# Patient Record
Sex: FEMALE | Race: WHITE | NOT HISPANIC OR LATINO
[De-identification: names, ages, dates, MRNs, and addresses within clinical notes are randomized per-mention and may not be internally consistent; named-entity substitution may affect disease eponyms.]

---

## 2021-02-22 PROBLEM — Z00.00 ENCOUNTER FOR PREVENTIVE HEALTH EXAMINATION: Status: ACTIVE | Noted: 2021-02-22

## 2021-03-15 ENCOUNTER — APPOINTMENT (OUTPATIENT)
Dept: ANTEPARTUM | Facility: CLINIC | Age: 21
End: 2021-03-15
Payer: COMMERCIAL

## 2021-03-15 ENCOUNTER — ASOB RESULT (OUTPATIENT)
Age: 21
End: 2021-03-15

## 2021-03-15 PROCEDURE — 76811 OB US DETAILED SNGL FETUS: CPT

## 2021-03-15 PROCEDURE — 99072 ADDL SUPL MATRL&STAF TM PHE: CPT

## 2021-03-23 ENCOUNTER — APPOINTMENT (OUTPATIENT)
Dept: ANTEPARTUM | Facility: CLINIC | Age: 21
End: 2021-03-23

## 2021-07-30 ENCOUNTER — INPATIENT (INPATIENT)
Facility: HOSPITAL | Age: 21
LOS: 1 days | Discharge: HOME | End: 2021-08-01
Attending: OBSTETRICS & GYNECOLOGY | Admitting: OBSTETRICS & GYNECOLOGY
Payer: COMMERCIAL

## 2021-07-30 VITALS — DIASTOLIC BLOOD PRESSURE: 63 MMHG | OXYGEN SATURATION: 98 % | HEART RATE: 100 BPM | SYSTOLIC BLOOD PRESSURE: 109 MMHG

## 2021-07-30 DIAGNOSIS — Z34.80 ENCOUNTER FOR SUPERVISION OF OTHER NORMAL PREGNANCY, UNSPECIFIED TRIMESTER: ICD-10-CM

## 2021-07-30 DIAGNOSIS — O26.899 OTHER SPECIFIED PREGNANCY RELATED CONDITIONS, UNSPECIFIED TRIMESTER: ICD-10-CM

## 2021-07-30 DIAGNOSIS — Z3A.00 WEEKS OF GESTATION OF PREGNANCY NOT SPECIFIED: ICD-10-CM

## 2021-07-30 LAB
BASOPHILS # BLD AUTO: 0.03 K/UL — SIGNIFICANT CHANGE UP (ref 0–0.2)
BASOPHILS NFR BLD AUTO: 0.2 % — SIGNIFICANT CHANGE UP (ref 0–2)
BLD GP AB SCN SERPL QL: NEGATIVE — SIGNIFICANT CHANGE UP
COVID-19 SPIKE DOMAIN AB INTERP: NEGATIVE — SIGNIFICANT CHANGE UP
COVID-19 SPIKE DOMAIN ANTIBODY RESULT: 0.4 U/ML — SIGNIFICANT CHANGE UP
EOSINOPHIL # BLD AUTO: 0.02 K/UL — SIGNIFICANT CHANGE UP (ref 0–0.5)
EOSINOPHIL NFR BLD AUTO: 0.1 % — SIGNIFICANT CHANGE UP (ref 0–6)
HCT VFR BLD CALC: 38.6 % — SIGNIFICANT CHANGE UP (ref 34.5–45)
HGB BLD-MCNC: 13 G/DL — SIGNIFICANT CHANGE UP (ref 11.5–15.5)
IMM GRANULOCYTES NFR BLD AUTO: 1.2 % — SIGNIFICANT CHANGE UP (ref 0–1.5)
LYMPHOCYTES # BLD AUTO: 1.64 K/UL — SIGNIFICANT CHANGE UP (ref 1–3.3)
LYMPHOCYTES # BLD AUTO: 9.6 % — LOW (ref 13–44)
MCHC RBC-ENTMCNC: 30.2 PG — SIGNIFICANT CHANGE UP (ref 27–34)
MCHC RBC-ENTMCNC: 33.7 GM/DL — SIGNIFICANT CHANGE UP (ref 32–36)
MCV RBC AUTO: 89.8 FL — SIGNIFICANT CHANGE UP (ref 80–100)
MONOCYTES # BLD AUTO: 0.83 K/UL — SIGNIFICANT CHANGE UP (ref 0–0.9)
MONOCYTES NFR BLD AUTO: 4.8 % — SIGNIFICANT CHANGE UP (ref 2–14)
NEUTROPHILS # BLD AUTO: 14.39 K/UL — HIGH (ref 1.8–7.4)
NEUTROPHILS NFR BLD AUTO: 84.1 % — HIGH (ref 43–77)
NRBC # BLD: 0 /100 WBCS — SIGNIFICANT CHANGE UP (ref 0–0)
PLATELET # BLD AUTO: 156 K/UL — SIGNIFICANT CHANGE UP (ref 150–400)
RBC # BLD: 4.3 M/UL — SIGNIFICANT CHANGE UP (ref 3.8–5.2)
RBC # FLD: 12.3 % — SIGNIFICANT CHANGE UP (ref 10.3–14.5)
RH IG SCN BLD-IMP: POSITIVE — SIGNIFICANT CHANGE UP
SARS-COV-2 IGG+IGM SERPL QL IA: 0.4 U/ML — SIGNIFICANT CHANGE UP
SARS-COV-2 IGG+IGM SERPL QL IA: NEGATIVE — SIGNIFICANT CHANGE UP
SARS-COV-2 RNA SPEC QL NAA+PROBE: SIGNIFICANT CHANGE UP
T PALLIDUM AB TITR SER: NEGATIVE — SIGNIFICANT CHANGE UP
WBC # BLD: 17.12 K/UL — HIGH (ref 3.8–10.5)
WBC # FLD AUTO: 17.12 K/UL — HIGH (ref 3.8–10.5)

## 2021-07-30 RX ORDER — SODIUM CHLORIDE 9 MG/ML
1000 INJECTION, SOLUTION INTRAVENOUS
Refills: 0 | Status: DISCONTINUED | OUTPATIENT
Start: 2021-07-30 | End: 2021-07-30

## 2021-07-30 RX ORDER — BENZOCAINE 10 %
1 GEL (GRAM) MUCOUS MEMBRANE EVERY 6 HOURS
Refills: 0 | Status: DISCONTINUED | OUTPATIENT
Start: 2021-07-30 | End: 2021-08-01

## 2021-07-30 RX ORDER — DIPHENHYDRAMINE HCL 50 MG
25 CAPSULE ORAL EVERY 6 HOURS
Refills: 0 | Status: DISCONTINUED | OUTPATIENT
Start: 2021-07-30 | End: 2021-08-01

## 2021-07-30 RX ORDER — OXYTOCIN 10 UNIT/ML
333.33 VIAL (ML) INJECTION
Qty: 20 | Refills: 0 | Status: DISCONTINUED | OUTPATIENT
Start: 2021-07-30 | End: 2021-07-30

## 2021-07-30 RX ORDER — LANOLIN
1 OINTMENT (GRAM) TOPICAL EVERY 6 HOURS
Refills: 0 | Status: DISCONTINUED | OUTPATIENT
Start: 2021-07-30 | End: 2021-08-01

## 2021-07-30 RX ORDER — OXYCODONE HYDROCHLORIDE 5 MG/1
5 TABLET ORAL
Refills: 0 | Status: DISCONTINUED | OUTPATIENT
Start: 2021-07-30 | End: 2021-08-01

## 2021-07-30 RX ORDER — DIBUCAINE 1 %
1 OINTMENT (GRAM) RECTAL EVERY 6 HOURS
Refills: 0 | Status: DISCONTINUED | OUTPATIENT
Start: 2021-07-30 | End: 2021-08-01

## 2021-07-30 RX ORDER — IBUPROFEN 200 MG
600 TABLET ORAL EVERY 6 HOURS
Refills: 0 | Status: COMPLETED | OUTPATIENT
Start: 2021-07-30 | End: 2022-06-28

## 2021-07-30 RX ORDER — KETOROLAC TROMETHAMINE 30 MG/ML
30 SYRINGE (ML) INJECTION ONCE
Refills: 0 | Status: DISCONTINUED | OUTPATIENT
Start: 2021-07-30 | End: 2021-07-30

## 2021-07-30 RX ORDER — IBUPROFEN 200 MG
600 TABLET ORAL EVERY 6 HOURS
Refills: 0 | Status: DISCONTINUED | OUTPATIENT
Start: 2021-07-30 | End: 2021-08-01

## 2021-07-30 RX ORDER — TETANUS TOXOID, REDUCED DIPHTHERIA TOXOID AND ACELLULAR PERTUSSIS VACCINE, ADSORBED 5; 2.5; 8; 8; 2.5 [IU]/.5ML; [IU]/.5ML; UG/.5ML; UG/.5ML; UG/.5ML
0.5 SUSPENSION INTRAMUSCULAR ONCE
Refills: 0 | Status: DISCONTINUED | OUTPATIENT
Start: 2021-07-30 | End: 2021-08-01

## 2021-07-30 RX ORDER — SODIUM CHLORIDE 9 MG/ML
1000 INJECTION, SOLUTION INTRAVENOUS
Refills: 0 | Status: DISCONTINUED | OUTPATIENT
Start: 2021-07-30 | End: 2021-08-01

## 2021-07-30 RX ORDER — SIMETHICONE 80 MG/1
80 TABLET, CHEWABLE ORAL EVERY 4 HOURS
Refills: 0 | Status: DISCONTINUED | OUTPATIENT
Start: 2021-07-30 | End: 2021-08-01

## 2021-07-30 RX ORDER — AER TRAVELER 0.5 G/1
1 SOLUTION RECTAL; TOPICAL EVERY 4 HOURS
Refills: 0 | Status: DISCONTINUED | OUTPATIENT
Start: 2021-07-30 | End: 2021-08-01

## 2021-07-30 RX ORDER — MAGNESIUM HYDROXIDE 400 MG/1
30 TABLET, CHEWABLE ORAL
Refills: 0 | Status: DISCONTINUED | OUTPATIENT
Start: 2021-07-30 | End: 2021-08-01

## 2021-07-30 RX ORDER — OXYTOCIN 10 UNIT/ML
333.33 VIAL (ML) INJECTION
Qty: 20 | Refills: 0 | Status: DISCONTINUED | OUTPATIENT
Start: 2021-07-30 | End: 2021-08-01

## 2021-07-30 RX ORDER — CITRIC ACID/SODIUM CITRATE 300-500 MG
15 SOLUTION, ORAL ORAL EVERY 6 HOURS
Refills: 0 | Status: DISCONTINUED | OUTPATIENT
Start: 2021-07-30 | End: 2021-07-30

## 2021-07-30 RX ORDER — SODIUM CHLORIDE 9 MG/ML
3 INJECTION INTRAMUSCULAR; INTRAVENOUS; SUBCUTANEOUS EVERY 8 HOURS
Refills: 0 | Status: DISCONTINUED | OUTPATIENT
Start: 2021-07-30 | End: 2021-08-01

## 2021-07-30 RX ORDER — HYDROCORTISONE 1 %
1 OINTMENT (GRAM) TOPICAL EVERY 6 HOURS
Refills: 0 | Status: DISCONTINUED | OUTPATIENT
Start: 2021-07-30 | End: 2021-08-01

## 2021-07-30 RX ORDER — ACETAMINOPHEN 500 MG
975 TABLET ORAL
Refills: 0 | Status: DISCONTINUED | OUTPATIENT
Start: 2021-07-30 | End: 2021-08-01

## 2021-07-30 RX ORDER — OXYCODONE HYDROCHLORIDE 5 MG/1
5 TABLET ORAL ONCE
Refills: 0 | Status: DISCONTINUED | OUTPATIENT
Start: 2021-07-30 | End: 2021-08-01

## 2021-07-30 RX ORDER — PRAMOXINE HYDROCHLORIDE 150 MG/15G
1 AEROSOL, FOAM RECTAL EVERY 4 HOURS
Refills: 0 | Status: DISCONTINUED | OUTPATIENT
Start: 2021-07-30 | End: 2021-08-01

## 2021-07-30 RX ADMIN — Medication 30 MILLIGRAM(S): at 08:43

## 2021-07-30 RX ADMIN — Medication 975 MILLIGRAM(S): at 17:05

## 2021-07-30 RX ADMIN — Medication 975 MILLIGRAM(S): at 23:15

## 2021-07-30 RX ADMIN — Medication 600 MILLIGRAM(S): at 15:33

## 2021-07-30 RX ADMIN — Medication 600 MILLIGRAM(S): at 15:03

## 2021-07-30 RX ADMIN — Medication 600 MILLIGRAM(S): at 20:22

## 2021-07-30 RX ADMIN — Medication 1000 MILLIUNIT(S)/MIN: at 08:51

## 2021-07-30 RX ADMIN — Medication 1 TABLET(S): at 15:03

## 2021-07-30 RX ADMIN — Medication 975 MILLIGRAM(S): at 22:40

## 2021-07-30 RX ADMIN — Medication 600 MILLIGRAM(S): at 20:55

## 2021-07-30 RX ADMIN — Medication 975 MILLIGRAM(S): at 16:35

## 2021-07-30 NOTE — OB PROVIDER H&P - HISTORY OF PRESENT ILLNESS
Admission H&P    Subjective  HPI: 20yo  @40+4 presents for chief concern of leakage of yellow-green fluid at 1:30am, with continuous leaking since then. Patient also endorses Ctx q2-3min. +FM. -VB. Pt denies any other concerns.    – PNC: Denies prenatal issues. GBS neg.  EFW 3400g by alverto.  – OBHx: denies  – GynHx: denies  – PMH: denies  – PSH: denies  – Psych: denies   – Social: denies   – Meds: PNV   – Allergies: NKDA  – Will accept blood transfusions? Yes

## 2021-07-30 NOTE — OB PROVIDER H&P - PROBLEM SELECTOR PLAN 1
Plan  1. Admit to LND. Routine Labs. IVF.  2. Expectant management.  3. Fetus: cat 1 tracing. VTX. EFW 3400g by sono. Continuous EFM. Sono. No concerns.  4. Prenatal issues: none  5. GBS neg  6. Pain: called for epidural    Patient discussed with attending physician, Dr. Verma.  Tina Aranda MD PGY2

## 2021-07-30 NOTE — OB RN DELIVERY SUMMARY - NS_SEPSISRSKCALC_OBGYN_ALL_OB_FT
EOS calculated successfully. EOS Risk Factor: 0.5/1000 live births ([facility] national incidence); GA=40w4d; Temp=98.6; ROM=5.9; GBS='Negative'; Antibiotics='No antibiotics or any antibiotics < 2 hrs prior to birth'

## 2021-07-30 NOTE — OB PROVIDER DELIVERY SUMMARY - NSPROVIDERDELIVERYNOTE_OBGYN_ALL_OB_FT
VAVD, pt delivered of a viable female infant apgar 9/9 . placenta complete and uterus explored. MLE, ebl 250 cc. vacuum done from OA+3 over two contractions secondary to NRFHT

## 2021-07-30 NOTE — OB NEONATOLOGY/PEDIATRICIAN DELIVERY SUMMARY - NSPEDSNEONOTESA_OBGYN_ALL_OB_FT
BABY ARY is a 40.4weeek GA FEMALE born to a 20yo  mother via vacuum-assisted vaginal delivery. No pop-offs. No significant maternal history. No significant prenatal history. BT O+. PNL -/-/immune/NR. GBS negative on . SROM 01:40 on  with light meconium. WDSS. APGARS 9/9. EOS 0.12. COVID NEGATIVE. Wants to breast feed. Declines hepatitis B vaccine.

## 2021-07-30 NOTE — OB PROVIDER H&P - NSHPPHYSICALEXAM_GEN_ALL_CORE
Objective  – Vital Signs  Vital Signs Last 24 Hrs  T(C): 36.7 (30 Jul 2021 05:17), Max: 36.7 (30 Jul 2021 05:11)  T(F): 98.1 (30 Jul 2021 05:11), Max: 98.1 (30 Jul 2021 05:11)  HR: 111 (30 Jul 2021 05:18) (81 - 111)  BP: 109/63 (30 Jul 2021 05:17) (109/63 - 109/63)  RR: 18 (30 Jul 2021 05:17) (18 - 18)  SpO2: 99% (30 Jul 2021 05:18) (94% - 99%)    – PE:   CV: RRR  Pulm: breathing comfortably on RA  Abd: gravid, nontender  Extr: moving all extremities with ease  – VE: 3/85/-1, grossly ruptured, light meconium  – FHT: baseline 140, mod variability, +accels, -decels  – [place]: q2-3min  – EFW: 3400g by sono  – Sono: vertex

## 2021-07-31 DIAGNOSIS — Z37.9 OUTCOME OF DELIVERY, UNSPECIFIED: ICD-10-CM

## 2021-07-31 RX ADMIN — Medication 600 MILLIGRAM(S): at 21:00

## 2021-07-31 RX ADMIN — Medication 600 MILLIGRAM(S): at 21:45

## 2021-07-31 RX ADMIN — Medication 1 TABLET(S): at 12:40

## 2021-07-31 RX ADMIN — Medication 975 MILLIGRAM(S): at 18:11

## 2021-07-31 RX ADMIN — Medication 975 MILLIGRAM(S): at 13:30

## 2021-07-31 RX ADMIN — Medication 975 MILLIGRAM(S): at 05:53

## 2021-07-31 RX ADMIN — Medication 600 MILLIGRAM(S): at 09:40

## 2021-07-31 RX ADMIN — Medication 600 MILLIGRAM(S): at 15:17

## 2021-07-31 RX ADMIN — Medication 600 MILLIGRAM(S): at 08:49

## 2021-07-31 RX ADMIN — Medication 600 MILLIGRAM(S): at 03:05

## 2021-07-31 RX ADMIN — Medication 975 MILLIGRAM(S): at 12:39

## 2021-07-31 RX ADMIN — Medication 975 MILLIGRAM(S): at 19:00

## 2021-07-31 RX ADMIN — Medication 600 MILLIGRAM(S): at 03:40

## 2021-07-31 RX ADMIN — Medication 600 MILLIGRAM(S): at 16:00

## 2021-07-31 NOTE — PROGRESS NOTE ADULT - SUBJECTIVE AND OBJECTIVE BOX
Postpartum Note- PPD#1    Prenatal Labs  Blood type: O Positive  Rubella IgG: Immune  RPR: Negative        S:Patient c/o perineal discomfort.  Pt is OOB, tolerating PO, voiding. Lochia WNL.     O:  Vital Signs Last 24 Hrs  T(C): 36.7 (31 Jul 2021 06:25), Max: 36.9 (30 Jul 2021 08:02)  T(F): 98 (31 Jul 2021 06:25), Max: 98.4 (30 Jul 2021 08:02)  HR: 91 (31 Jul 2021 06:25) (91 - 142)  BP: 106/67 (31 Jul 2021 06:25) (102/52 - 125/74)  BP(mean): --  RR: 18 (31 Jul 2021 06:25) (15 - 18)  SpO2: 96% (31 Jul 2021 06:25) (93% - 99%)     Gen: NAD  Abdomen: Soft, nontender, non-distended, fundus firm.  Vaginal: Lochia WNL,    Ext:  Neg calf tenderness    LABS:    Hemoglobin: 13.0 g/dL (07-30 @ 05:04)      Hematocrit: 38.6 % (07-30 @ 05:04)

## 2021-08-01 ENCOUNTER — TRANSCRIPTION ENCOUNTER (OUTPATIENT)
Age: 21
End: 2021-08-01

## 2021-08-01 VITALS
OXYGEN SATURATION: 98 % | SYSTOLIC BLOOD PRESSURE: 102 MMHG | RESPIRATION RATE: 18 BRPM | TEMPERATURE: 98 F | DIASTOLIC BLOOD PRESSURE: 65 MMHG | HEART RATE: 93 BPM

## 2021-08-01 PROCEDURE — U0003: CPT

## 2021-08-01 PROCEDURE — 59050 FETAL MONITOR W/REPORT: CPT

## 2021-08-01 PROCEDURE — 86900 BLOOD TYPING SEROLOGIC ABO: CPT

## 2021-08-01 PROCEDURE — G0463: CPT

## 2021-08-01 PROCEDURE — 59025 FETAL NON-STRESS TEST: CPT

## 2021-08-01 PROCEDURE — U0005: CPT

## 2021-08-01 PROCEDURE — 86901 BLOOD TYPING SEROLOGIC RH(D): CPT

## 2021-08-01 PROCEDURE — 86850 RBC ANTIBODY SCREEN: CPT

## 2021-08-01 PROCEDURE — 85025 COMPLETE CBC W/AUTO DIFF WBC: CPT

## 2021-08-01 PROCEDURE — 86769 SARS-COV-2 COVID-19 ANTIBODY: CPT

## 2021-08-01 PROCEDURE — 86780 TREPONEMA PALLIDUM: CPT

## 2021-08-01 RX ORDER — OXYCODONE HYDROCHLORIDE 5 MG/1
1 TABLET ORAL
Qty: 12 | Refills: 0
Start: 2021-08-01 | End: 2021-08-03

## 2021-08-01 RX ADMIN — Medication 600 MILLIGRAM(S): at 09:00

## 2021-08-01 RX ADMIN — Medication 600 MILLIGRAM(S): at 03:45

## 2021-08-01 RX ADMIN — Medication 975 MILLIGRAM(S): at 12:02

## 2021-08-01 RX ADMIN — OXYCODONE HYDROCHLORIDE 5 MILLIGRAM(S): 5 TABLET ORAL at 09:06

## 2021-08-01 RX ADMIN — Medication 975 MILLIGRAM(S): at 00:05

## 2021-08-01 RX ADMIN — Medication 600 MILLIGRAM(S): at 03:00

## 2021-08-01 RX ADMIN — Medication 600 MILLIGRAM(S): at 09:30

## 2021-08-01 RX ADMIN — Medication 975 MILLIGRAM(S): at 06:17

## 2021-08-01 RX ADMIN — Medication 975 MILLIGRAM(S): at 00:50

## 2021-08-01 RX ADMIN — Medication 975 MILLIGRAM(S): at 06:55

## 2021-08-01 RX ADMIN — Medication 975 MILLIGRAM(S): at 12:32

## 2021-08-01 RX ADMIN — OXYCODONE HYDROCHLORIDE 5 MILLIGRAM(S): 5 TABLET ORAL at 09:36

## 2021-08-01 NOTE — DISCHARGE NOTE OB - CARE PROVIDER_API CALL
France Verma  OBSTETRICS AND GYNECOLOGY  [address], [address]  [place] [address]  Phone: [phone]  Fax: [phone]  Follow Up Time:

## 2021-08-01 NOTE — DISCHARGE NOTE OB - WILL THE PATIENT ACCEPT THE PFIZER COVID-19 VACCINE IF ELIGIBLE AND IT IS AVAILABLE?
Health Maintenance Due   Topic Date Due   • Pneumococcal Vaccine 0-64 (1 of 1 - PPSV23) 08/16/1964   • Shingles Vaccine (1 of 2) 08/16/2008       Patient is due for topics as listed above but is not proceeding with Immunization(s) Pneumococcal and Shingles and Abdominal Aortic Aneurysm (AAA) screening at this time.          No

## 2021-08-01 NOTE — DISCHARGE NOTE OB - CARE PLAN
Principal Discharge DX:	Vacuum-assisted vaginal delivery  Goal:	home  Assessment and plan of treatment:	as per PMD

## 2021-08-01 NOTE — DISCHARGE NOTE OB - WEAR SUPPORTIVE BRA
Spoke to patient notified to stop the medrol and continue with the other medications. If her glucose remains high should speak with her endocrinologist.     Statement Selected

## 2021-08-01 NOTE — DISCHARGE NOTE OB - MATERIALS PROVIDED
[facility] [place] Screening Program/Breastfeeding Log/Breastfeeding Mother’s Support Group Information/Guide to Postpartum Care/[facility] Hearing Screen Program/Back To Sleep Handout/Shaken Baby Prevention Handout/Breastfeeding Guide and Packet/Birth Certificate Instructions/Discharge Medication Information for Patients and Families Pocket Guide

## 2022-02-24 NOTE — OB RN PATIENT PROFILE - PRO PRENATAL LABS ORI SOURCE RUBELLA
Dr. Patel's nurse called back. Dr. Patel is in surgery. She will call clinic when she is finished.    hard copy, drawn during this pregnancy

## 2023-02-16 PROBLEM — Z78.9 OTHER SPECIFIED HEALTH STATUS: Chronic | Status: ACTIVE | Noted: 2021-07-30

## 2023-03-14 ENCOUNTER — NON-APPOINTMENT (OUTPATIENT)
Age: 23
End: 2023-03-14

## 2023-03-14 ENCOUNTER — APPOINTMENT (OUTPATIENT)
Dept: ANTEPARTUM | Facility: CLINIC | Age: 23
End: 2023-03-14
Payer: COMMERCIAL

## 2023-03-14 ENCOUNTER — ASOB RESULT (OUTPATIENT)
Age: 23
End: 2023-03-14

## 2023-03-14 PROCEDURE — 76805 OB US >/= 14 WKS SNGL FETUS: CPT

## 2023-06-09 ENCOUNTER — ASOB RESULT (OUTPATIENT)
Age: 23
End: 2023-06-09

## 2023-06-09 ENCOUNTER — APPOINTMENT (OUTPATIENT)
Dept: ANTEPARTUM | Facility: CLINIC | Age: 23
End: 2023-06-09
Payer: COMMERCIAL

## 2023-06-09 PROCEDURE — 76816 OB US FOLLOW-UP PER FETUS: CPT

## 2023-06-09 PROCEDURE — 76818 FETAL BIOPHYS PROFILE W/NST: CPT | Mod: 59

## 2023-07-24 ENCOUNTER — ASOB RESULT (OUTPATIENT)
Age: 23
End: 2023-07-24

## 2023-07-24 ENCOUNTER — APPOINTMENT (OUTPATIENT)
Dept: ANTEPARTUM | Facility: CLINIC | Age: 23
End: 2023-07-24
Payer: COMMERCIAL

## 2023-07-24 ENCOUNTER — INPATIENT (INPATIENT)
Facility: HOSPITAL | Age: 23
LOS: 1 days | Discharge: HOME | End: 2023-07-26
Attending: OBSTETRICS & GYNECOLOGY | Admitting: OBSTETRICS & GYNECOLOGY
Payer: COMMERCIAL

## 2023-07-24 VITALS — HEART RATE: 112 BPM | OXYGEN SATURATION: 98 %

## 2023-07-24 DIAGNOSIS — O26.899 OTHER SPECIFIED PREGNANCY RELATED CONDITIONS, UNSPECIFIED TRIMESTER: ICD-10-CM

## 2023-07-24 DIAGNOSIS — Z34.80 ENCOUNTER FOR SUPERVISION OF OTHER NORMAL PREGNANCY, UNSPECIFIED TRIMESTER: ICD-10-CM

## 2023-07-24 LAB
BASOPHILS # BLD AUTO: 0.03 K/UL — SIGNIFICANT CHANGE UP (ref 0–0.2)
BASOPHILS NFR BLD AUTO: 0.3 % — SIGNIFICANT CHANGE UP (ref 0–2)
EOSINOPHIL # BLD AUTO: 0.07 K/UL — SIGNIFICANT CHANGE UP (ref 0–0.5)
EOSINOPHIL NFR BLD AUTO: 0.8 % — SIGNIFICANT CHANGE UP (ref 0–6)
HCT VFR BLD CALC: 40.3 % — SIGNIFICANT CHANGE UP (ref 34.5–45)
HGB BLD-MCNC: 13.6 G/DL — SIGNIFICANT CHANGE UP (ref 11.5–15.5)
IMM GRANULOCYTES NFR BLD AUTO: 1.7 % — HIGH (ref 0–0.9)
LYMPHOCYTES # BLD AUTO: 1.87 K/UL — SIGNIFICANT CHANGE UP (ref 1–3.3)
LYMPHOCYTES # BLD AUTO: 20.7 % — SIGNIFICANT CHANGE UP (ref 13–44)
MCHC RBC-ENTMCNC: 29.9 PG — SIGNIFICANT CHANGE UP (ref 27–34)
MCHC RBC-ENTMCNC: 33.7 GM/DL — SIGNIFICANT CHANGE UP (ref 32–36)
MCV RBC AUTO: 88.6 FL — SIGNIFICANT CHANGE UP (ref 80–100)
MONOCYTES # BLD AUTO: 0.58 K/UL — SIGNIFICANT CHANGE UP (ref 0–0.9)
MONOCYTES NFR BLD AUTO: 6.4 % — SIGNIFICANT CHANGE UP (ref 2–14)
NEUTROPHILS # BLD AUTO: 6.33 K/UL — SIGNIFICANT CHANGE UP (ref 1.8–7.4)
NEUTROPHILS NFR BLD AUTO: 70.1 % — SIGNIFICANT CHANGE UP (ref 43–77)
NRBC # BLD: 0 /100 WBCS — SIGNIFICANT CHANGE UP (ref 0–0)
PLATELET # BLD AUTO: 152 K/UL — SIGNIFICANT CHANGE UP (ref 150–400)
RBC # BLD: 4.55 M/UL — SIGNIFICANT CHANGE UP (ref 3.8–5.2)
RBC # FLD: 13.4 % — SIGNIFICANT CHANGE UP (ref 10.3–14.5)
WBC # BLD: 9.03 K/UL — SIGNIFICANT CHANGE UP (ref 3.8–10.5)
WBC # FLD AUTO: 9.03 K/UL — SIGNIFICANT CHANGE UP (ref 3.8–10.5)

## 2023-07-24 PROCEDURE — 76816 OB US FOLLOW-UP PER FETUS: CPT

## 2023-07-24 PROCEDURE — 76819 FETAL BIOPHYS PROFIL W/O NST: CPT | Mod: 59

## 2023-07-24 PROCEDURE — 76820 UMBILICAL ARTERY ECHO: CPT | Mod: 59

## 2023-07-24 RX ORDER — AMPICILLIN TRIHYDRATE 250 MG
2 CAPSULE ORAL ONCE
Refills: 0 | Status: COMPLETED | OUTPATIENT
Start: 2023-07-24 | End: 2023-07-24

## 2023-07-24 RX ORDER — MAGNESIUM HYDROXIDE 400 MG/1
30 TABLET, CHEWABLE ORAL
Refills: 0 | Status: DISCONTINUED | OUTPATIENT
Start: 2023-07-24 | End: 2023-07-26

## 2023-07-24 RX ORDER — ACETAMINOPHEN 500 MG
975 TABLET ORAL
Refills: 0 | Status: DISCONTINUED | OUTPATIENT
Start: 2023-07-24 | End: 2023-07-26

## 2023-07-24 RX ORDER — KETOROLAC TROMETHAMINE 30 MG/ML
30 SYRINGE (ML) INJECTION ONCE
Refills: 0 | Status: DISCONTINUED | OUTPATIENT
Start: 2023-07-24 | End: 2023-07-26

## 2023-07-24 RX ORDER — BENZOCAINE 10 %
1 GEL (GRAM) MUCOUS MEMBRANE EVERY 6 HOURS
Refills: 0 | Status: DISCONTINUED | OUTPATIENT
Start: 2023-07-24 | End: 2023-07-26

## 2023-07-24 RX ORDER — SODIUM CHLORIDE 9 MG/ML
1000 INJECTION, SOLUTION INTRAVENOUS ONCE
Refills: 0 | Status: COMPLETED | OUTPATIENT
Start: 2023-07-24 | End: 2023-07-24

## 2023-07-24 RX ORDER — DIPHENHYDRAMINE HCL 50 MG
25 CAPSULE ORAL EVERY 6 HOURS
Refills: 0 | Status: DISCONTINUED | OUTPATIENT
Start: 2023-07-24 | End: 2023-07-26

## 2023-07-24 RX ORDER — AER TRAVELER 0.5 G/1
1 SOLUTION RECTAL; TOPICAL EVERY 4 HOURS
Refills: 0 | Status: DISCONTINUED | OUTPATIENT
Start: 2023-07-24 | End: 2023-07-26

## 2023-07-24 RX ORDER — SODIUM CHLORIDE 9 MG/ML
3 INJECTION INTRAMUSCULAR; INTRAVENOUS; SUBCUTANEOUS EVERY 8 HOURS
Refills: 0 | Status: DISCONTINUED | OUTPATIENT
Start: 2023-07-24 | End: 2023-07-26

## 2023-07-24 RX ORDER — OXYTOCIN 10 UNIT/ML
333.33 VIAL (ML) INJECTION
Qty: 20 | Refills: 0 | Status: COMPLETED | OUTPATIENT
Start: 2023-07-24 | End: 2023-07-24

## 2023-07-24 RX ORDER — AMPICILLIN TRIHYDRATE 250 MG
1 CAPSULE ORAL EVERY 4 HOURS
Refills: 0 | Status: DISCONTINUED | OUTPATIENT
Start: 2023-07-24 | End: 2023-07-24

## 2023-07-24 RX ORDER — IBUPROFEN 200 MG
600 TABLET ORAL EVERY 6 HOURS
Refills: 0 | Status: COMPLETED | OUTPATIENT
Start: 2023-07-24 | End: 2024-06-21

## 2023-07-24 RX ORDER — SODIUM CHLORIDE 9 MG/ML
1000 INJECTION, SOLUTION INTRAVENOUS
Refills: 0 | Status: DISCONTINUED | OUTPATIENT
Start: 2023-07-24 | End: 2023-07-24

## 2023-07-24 RX ORDER — OXYCODONE HYDROCHLORIDE 5 MG/1
5 TABLET ORAL
Refills: 0 | Status: DISCONTINUED | OUTPATIENT
Start: 2023-07-24 | End: 2023-07-26

## 2023-07-24 RX ORDER — HYDROCORTISONE 1 %
1 OINTMENT (GRAM) TOPICAL EVERY 6 HOURS
Refills: 0 | Status: DISCONTINUED | OUTPATIENT
Start: 2023-07-24 | End: 2023-07-26

## 2023-07-24 RX ORDER — SIMETHICONE 80 MG/1
80 TABLET, CHEWABLE ORAL EVERY 4 HOURS
Refills: 0 | Status: DISCONTINUED | OUTPATIENT
Start: 2023-07-24 | End: 2023-07-26

## 2023-07-24 RX ORDER — CITRIC ACID/SODIUM CITRATE 300-500 MG
15 SOLUTION, ORAL ORAL EVERY 6 HOURS
Refills: 0 | Status: DISCONTINUED | OUTPATIENT
Start: 2023-07-24 | End: 2023-07-25

## 2023-07-24 RX ORDER — TETANUS TOXOID, REDUCED DIPHTHERIA TOXOID AND ACELLULAR PERTUSSIS VACCINE, ADSORBED 5; 2.5; 8; 8; 2.5 [IU]/.5ML; [IU]/.5ML; UG/.5ML; UG/.5ML; UG/.5ML
0.5 SUSPENSION INTRAMUSCULAR ONCE
Refills: 0 | Status: DISCONTINUED | OUTPATIENT
Start: 2023-07-24 | End: 2023-07-26

## 2023-07-24 RX ORDER — OXYCODONE HYDROCHLORIDE 5 MG/1
5 TABLET ORAL ONCE
Refills: 0 | Status: DISCONTINUED | OUTPATIENT
Start: 2023-07-24 | End: 2023-07-26

## 2023-07-24 RX ORDER — DIBUCAINE 1 %
1 OINTMENT (GRAM) RECTAL EVERY 6 HOURS
Refills: 0 | Status: DISCONTINUED | OUTPATIENT
Start: 2023-07-24 | End: 2023-07-26

## 2023-07-24 RX ORDER — OXYTOCIN 10 UNIT/ML
41.67 VIAL (ML) INJECTION
Qty: 20 | Refills: 0 | Status: DISCONTINUED | OUTPATIENT
Start: 2023-07-24 | End: 2023-07-26

## 2023-07-24 RX ORDER — CHLORHEXIDINE GLUCONATE 213 G/1000ML
1 SOLUTION TOPICAL DAILY
Refills: 0 | Status: DISCONTINUED | OUTPATIENT
Start: 2023-07-24 | End: 2023-07-25

## 2023-07-24 RX ORDER — OXYTOCIN 10 UNIT/ML
4 VIAL (ML) INJECTION
Qty: 30 | Refills: 0 | Status: DISCONTINUED | OUTPATIENT
Start: 2023-07-24 | End: 2023-07-24

## 2023-07-24 RX ORDER — PRAMOXINE HYDROCHLORIDE 150 MG/15G
1 AEROSOL, FOAM RECTAL EVERY 4 HOURS
Refills: 0 | Status: DISCONTINUED | OUTPATIENT
Start: 2023-07-24 | End: 2023-07-26

## 2023-07-24 RX ORDER — LANOLIN
1 OINTMENT (GRAM) TOPICAL EVERY 6 HOURS
Refills: 0 | Status: DISCONTINUED | OUTPATIENT
Start: 2023-07-24 | End: 2023-07-26

## 2023-07-24 RX ADMIN — Medication 4 MILLIUNIT(S)/MIN: at 17:54

## 2023-07-24 RX ADMIN — SODIUM CHLORIDE 125 MILLILITER(S): 9 INJECTION, SOLUTION INTRAVENOUS at 17:20

## 2023-07-24 RX ADMIN — Medication 1000 MILLIUNIT(S)/MIN: at 21:56

## 2023-07-24 RX ADMIN — SODIUM CHLORIDE 1000 MILLILITER(S): 9 INJECTION, SOLUTION INTRAVENOUS at 19:46

## 2023-07-24 RX ADMIN — Medication 200 GRAM(S): at 17:30

## 2023-07-24 RX ADMIN — Medication 108 GRAM(S): at 21:22

## 2023-07-24 RX ADMIN — Medication 10 UNIT(S): at 21:57

## 2023-07-24 NOTE — OB RN PATIENT PROFILE - DOES PATIENT HAVE ADVANCE DIRECTIVE
Nepro 1can daily as medically feasible ( 425 kcal, 19 g protein)/DASH/TLC (sodium and cholesterol restricted diet)/regular/1200ml/renal replacement pts:no protein restr,no conc K & phos, low sodium No Nepro 1can daily as medically feasible ( 425 kcal, 19 g protein)./DASH/TLC (sodium and cholesterol restricted diet)/regular/1200ml/renal replacement pts:no protein restr,no conc K & phos, low sodium

## 2023-07-24 NOTE — OB PROVIDER H&P - NSHPPHYSICALEXAM_GEN_ALL_CORE
Vital Signs Last 24 Hrs  T(C): 36.9 (24 Jul 2023 16:30), Max: 36.9 (24 Jul 2023 16:20)  T(F): 98.4 (24 Jul 2023 16:30), Max: 98.42 (24 Jul 2023 16:20)  HR: 95 (24 Jul 2023 16:43) (88 - 112)  BP: 118/72 (24 Jul 2023 16:30) (118/72 - 118/72)  BP(mean): --  RR: 18 (24 Jul 2023 16:30) (18 - 18)  SpO2: 97% (24 Jul 2023 16:43) (96% - 98%)    Parameters below as of 24 Jul 2023 16:30  Patient On (Oxygen Delivery Method): room air    General: NAD, A&Ox3  CV: RRR  Lungs: CTA b/l  Abdomen: Soft, NT, gravid

## 2023-07-24 NOTE — OB RN PATIENT PROFILE - FALL HARM RISK - UNIVERSAL INTERVENTIONS
Bed in lowest position, wheels locked, appropriate side rails in place/Call bell, personal items and telephone in reach/Instruct patient to call for assistance before getting out of bed or chair/Non-slip footwear when patient is out of bed/[place] to call system/Physically safe environment - no spills, clutter or unnecessary equipment/Purposeful Proactive Rounding/Room/bathroom lighting operational, light cord in reach

## 2023-07-24 NOTE — OB RN PATIENT PROFILE - STEPS TO INITIATE SKIN TO SKIN CONTACT DISCUSSED, INCLUDING INITIATING FATHER SKIN TO SKIN IF POSSIBLE.
patient feeling better, tolerating PO. abdomen soft nontender nondistended upon re-eval. CT shows no appendicitis. Possible enteritis. Incidental hepatic finding, new from 2015. probable viral enteritis, advised hydration, BRAT diet, zofran prn, f/u PMD, return precautions discussed. Statement Selected

## 2023-07-24 NOTE — OB RN DELIVERY SUMMARY - NSSELHIDDEN_OBGYN_ALL_OB_FT
[NS_DeliveryAttending1_OBGYN_ALL_OB_FT:[email or web address]=],[NS_DeliveryRN_OBGYN_ALL_OB_FT:[id number]=]

## 2023-07-24 NOTE — OB PROVIDER H&P - NSSCHADMISSION_OBGYN_A_OB
1.  Every day sleep  at night or rest ( some days we are unable to sleep )around the same time without the TV-this is important habit to reduce dementia and aging prematurely    2.  Eat 3 cups of green leafy vegetables daily include at least 1 fruit.,  Reduce animal protein consumption-/ also  Starch  consumption  --this will help to lose weight avoid illnesses such as dementia high blood pressure cancer.,  Diabetes    3.  Exercise including aerobics as well as resistant exercise for at least 45 minutes a day for 5 days this will also help to reduce premature aging       4.Please take Vit D 2000iu  daily    5.  70 oz of water daily   Yes

## 2023-07-24 NOTE — OB PROVIDER H&P - HISTORY OF PRESENT ILLNESS
PA Note:  23y  @40wks and 3 days gestation presenting for IOL for IUGR. On ultrasound today, the overall EFW was 7#4 (19%ile) but the AC was in the 1%ile. Dopplers WNL and vertex presentation was noted. Patient denies ctx, LOF or VB. PNC otherwise uncomplicated. +FM. GBS +.     POBHx: -VAVD, FT, 7#4  PGYNHx: Denies fibroids, ovarian cysts, abnormal pap smears, STD's  PMHx: Denies  Medications: PNV  Allergies: NKDA  PSHx: Denies  Social Hx: Denies etoh/tobacco/drug use. Denies anxiety/depression/pp depression     Vital Signs Last 24 Hrs  T(C): 36.9 (2023 16:30), Max: 36.9 (2023 16:20)  T(F): 98.4 (2023 16:30), Max: 98.42 (2023 16:20)  HR: 95 (2023 16:43) (88 - 112)  BP: 118/72 (2023 16:30) (118/72 - 118/72)  BP(mean): --  RR: 18 (2023 16:30) (18 - 18)  SpO2: 97% (2023 16:43) (96% - 98%)    Parameters below as of 2023 16:30  Patient On (Oxygen Delivery Method): room air    General: NAD, A&Ox3  CV: RRR  Lungs: CTA b/l  Abdomen: Soft, NT, gravid    VE: 2/80/-3, examined by Dr. Verma in office today  EFM: 120/moderate variability/+accels/no decels  [place]: No ctx

## 2023-07-24 NOTE — OB RN PATIENT PROFILE - FUNCTIONAL ASSESSMENT - DAILY ACTIVITY SCORE HIDDEN
Patient Specific Counseling (Will Not Stick From Patient To Patient): right upper back\\n0.1cc used\\n\\nmid chest 0.2cc used
Detail Level: Detailed
Patient Specific Counseling (Will Not Stick From Patient To Patient): reflared 2 weeks ago\\n\\nrf oracea 40mg qd x 2-3 months\\nctn soolantra cream qhs\\n\\nf/u 6 weeks
Detail Level: Zone
24

## 2023-07-24 NOTE — OB PROVIDER DELIVERY SUMMARY - NSPROVIDERDELIVERYNOTE_OBGYN_ALL_OB_FT
, pt delivered ofa viable male infant apgar 9/9, placenta complete and uterus explored. periurethral laceration. ebl 250 cc

## 2023-07-24 NOTE — OB RN TRIAGE NOTE - FALL HARM RISK - UNIVERSAL INTERVENTIONS
Bed in lowest position, wheels locked, appropriate side rails in place/Call bell, personal items and telephone in reach/Instruct patient to call for assistance before getting out of bed or chair/Non-slip footwear when patient is out of bed/[place] to call system/Physically safe environment - no spills, clutter or unnecessary equipment/Purposeful Proactive Rounding/Room/bathroom lighting operational, light cord in reach

## 2023-07-24 NOTE — OB RN DELIVERY SUMMARY - NS_SEPSISRSKCALC_OBGYN_ALL_OB_FT
EOS calculated successfully. EOS Risk Factor: 0.5/1000 live births ([facility] national incidence); GA=40w3d; Temp=98.42; ROM=2.133; GBS='Positive'; Antibiotics='No antibiotics or any antibiotics < 2 hrs prior to birth'

## 2023-07-24 NOTE — OB PROVIDER H&P - ASSESSMENT
A/P:  23y  @40wks and 3 days gestation presenting for IOL for IUGR. +FM. GBS +. EFW 3300g  -Admit to L&D  -Routine labs  -EFM/[place]  -NPO, IVF, Bicitra  -IOL with pitocin  -Ampicillin for gbs ppx  -Anesthesia consult  -Anticipate   D/w Dr. Bayron Rea PA-C

## 2023-07-25 LAB
COVID-19 SPIKE DOMAIN AB INTERP: POSITIVE
COVID-19 SPIKE DOMAIN ANTIBODY RESULT: 143 U/ML — HIGH
SARS-COV-2 IGG+IGM SERPL QL IA: 143 U/ML — HIGH
SARS-COV-2 IGG+IGM SERPL QL IA: POSITIVE
T PALLIDUM AB TITR SER: NEGATIVE — SIGNIFICANT CHANGE UP

## 2023-07-25 RX ORDER — OXYTOCIN 10 UNIT/ML
10 VIAL (ML) INJECTION ONCE
Refills: 0 | Status: COMPLETED | OUTPATIENT
Start: 2023-07-25 | End: 2023-07-24

## 2023-07-25 RX ORDER — IBUPROFEN 200 MG
600 TABLET ORAL EVERY 6 HOURS
Refills: 0 | Status: DISCONTINUED | OUTPATIENT
Start: 2023-07-25 | End: 2023-07-26

## 2023-07-25 RX ORDER — SENNA PLUS 8.6 MG/1
2 TABLET ORAL AT BEDTIME
Refills: 0 | Status: DISCONTINUED | OUTPATIENT
Start: 2023-07-25 | End: 2023-07-26

## 2023-07-25 RX ADMIN — Medication 600 MILLIGRAM(S): at 18:08

## 2023-07-25 RX ADMIN — Medication 600 MILLIGRAM(S): at 23:22

## 2023-07-25 RX ADMIN — Medication 600 MILLIGRAM(S): at 12:18

## 2023-07-25 RX ADMIN — Medication 975 MILLIGRAM(S): at 21:03

## 2023-07-25 RX ADMIN — Medication 975 MILLIGRAM(S): at 16:15

## 2023-07-25 RX ADMIN — Medication 975 MILLIGRAM(S): at 15:42

## 2023-07-25 RX ADMIN — Medication 600 MILLIGRAM(S): at 06:43

## 2023-07-25 RX ADMIN — Medication 600 MILLIGRAM(S): at 12:48

## 2023-07-25 RX ADMIN — Medication 975 MILLIGRAM(S): at 09:13

## 2023-07-25 RX ADMIN — Medication 975 MILLIGRAM(S): at 09:45

## 2023-07-25 RX ADMIN — Medication 600 MILLIGRAM(S): at 17:47

## 2023-07-25 RX ADMIN — SENNA PLUS 2 TABLET(S): 8.6 TABLET ORAL at 23:22

## 2023-07-25 RX ADMIN — Medication 1 TABLET(S): at 12:19

## 2023-07-25 RX ADMIN — Medication 975 MILLIGRAM(S): at 21:30

## 2023-07-25 NOTE — PROGRESS NOTE ADULT - ASSESSMENT
Patient is PPD#1 from [facility] meeting all postpartum milestones. Vital signs are stable and physical exam is unremarkable.    #Routine postpartum care  - Continue with po analgesia  - Increase ambulation  - Continue regular diet  - No labs    YESIKA Urbina, PGY-1

## 2023-07-25 NOTE — PROGRESS NOTE ADULT - SUBJECTIVE AND OBJECTIVE BOX
Patient seen and examined at bedside, no acute overnight events. No acute complaints, pain well controlled. Patient is ambulating, voiding spontaneously, passing gas, and tolerating regular diet. Denies CP, SOB, N/V, HA, blurred vision, epigastric pain.    Vital Signs Last 24 Hours  T(C): 36.9 (07-25-23 @ 01:10), Max: 36.9 (07-24-23 @ 16:20)  HR: 94 (07-25-23 @ 01:10) (81 - 140)  BP: 122/81 (07-25-23 @ 01:10) (94/57 - 122/81)  RR: 18 (07-25-23 @ 01:10) (18 - 18)  SpO2: 96% (07-25-23 @ 01:10) (91% - 100%)    Physical Exam:  General: NAD  Abdomen: Soft, non-tender, non-distended, fundus firm  Pelvic: Lochia wnl    Labs:    Blood Type: O Positive  Antibody Screen: Negative               13.6   9.03  )-----------( 152      ( 07-24 @ 17:35 )             40.3

## 2023-07-26 ENCOUNTER — TRANSCRIPTION ENCOUNTER (OUTPATIENT)
Age: 23
End: 2023-07-26

## 2023-07-26 VITALS
RESPIRATION RATE: 18 BRPM | OXYGEN SATURATION: 97 % | DIASTOLIC BLOOD PRESSURE: 74 MMHG | TEMPERATURE: 99 F | HEART RATE: 93 BPM | SYSTOLIC BLOOD PRESSURE: 108 MMHG

## 2023-07-26 PROCEDURE — 59050 FETAL MONITOR W/REPORT: CPT

## 2023-07-26 PROCEDURE — 86780 TREPONEMA PALLIDUM: CPT

## 2023-07-26 PROCEDURE — 86850 RBC ANTIBODY SCREEN: CPT

## 2023-07-26 PROCEDURE — 85025 COMPLETE CBC W/AUTO DIFF WBC: CPT

## 2023-07-26 PROCEDURE — 86769 SARS-COV-2 COVID-19 ANTIBODY: CPT

## 2023-07-26 PROCEDURE — 86900 BLOOD TYPING SEROLOGIC ABO: CPT

## 2023-07-26 PROCEDURE — 86901 BLOOD TYPING SEROLOGIC RH(D): CPT

## 2023-07-26 RX ORDER — IBUPROFEN 200 MG
1 TABLET ORAL
Qty: 0 | Refills: 0 | DISCHARGE
Start: 2023-07-26

## 2023-07-26 RX ORDER — ACETAMINOPHEN 500 MG
3 TABLET ORAL
Qty: 0 | Refills: 0 | DISCHARGE
Start: 2023-07-26

## 2023-07-26 RX ADMIN — Medication 600 MILLIGRAM(S): at 00:00

## 2023-07-26 RX ADMIN — Medication 600 MILLIGRAM(S): at 06:37

## 2023-07-26 RX ADMIN — Medication 975 MILLIGRAM(S): at 09:50

## 2023-07-26 RX ADMIN — Medication 600 MILLIGRAM(S): at 06:07

## 2023-07-26 RX ADMIN — Medication 975 MILLIGRAM(S): at 03:00

## 2023-07-26 RX ADMIN — Medication 975 MILLIGRAM(S): at 09:17

## 2023-07-26 RX ADMIN — Medication 600 MILLIGRAM(S): at 12:13

## 2023-07-26 RX ADMIN — Medication 975 MILLIGRAM(S): at 02:36

## 2023-07-26 RX ADMIN — Medication 1 TABLET(S): at 12:13

## 2023-07-26 NOTE — DISCHARGE NOTE OB - CARE PLAN
1 Principal Discharge DX:	 (normal spontaneous vaginal delivery)  Assessment and plan of treatment:	Return to baseline health, regular diet, pain control. Follow up with Dr. Verma.

## 2023-07-26 NOTE — DISCHARGE NOTE OB - AVOID DOUCHING OR TAMPONS UNTIL YOUR POSTPARTUM VISIT
Medication:    Outpatient Medications Marked as Taking for the 8/6/21 encounter (Refill) with Preston Castillo MD   Medication Sig Dispense Refill   • buprenorphine-naLOXone (SUBOXONE FILM) 2-0.5 MG sublingual film Place 1 strip under the tongue daily. 30 each 3   • lisdexamfetamine (VYVANSE) 20 MG capsule Take 1 capsule by mouth daily. 30 capsule 0   • lisdexamfetamine (VYVANSE) 70 MG capsule Take 1 capsule by mouth every morning. 30 capsule 0       Message to Prescriber:     [x] Pharmacy has been verified.    [] Patient completely out of medication (*Route encounter as high priority if checked)    [x] Patient informed refill request can take up to 5 business days to be processed    Patient currently has follow up appointment scheduled       Statement Selected

## 2023-07-26 NOTE — DISCHARGE NOTE OB - NS MD DC FALL RISK RISK
For information on Fall & Injury Prevention, visit: [email or web address] OR  [email or web address] OR  https://www.cdc.gov/steadi/patient.html

## 2023-07-26 NOTE — DISCHARGE NOTE OB - PATIENT PORTAL LINK FT
You can access the FollowMyHealth Patient Portal offered by [facility] by registering at the following website: [email or web address]. By joining [employer]’s FollowMyHealth portal, you will also be able to view your health information using other applications (apps) compatible with our system.

## 2023-07-26 NOTE — DISCHARGE NOTE OB - CARE PROVIDER_API CALL
France Verma  Obstetrics and Gynecology  [address], [address]  [place] [address]  Phone: [phone]  Fax: [phone]  Follow Up Time:

## 2023-11-02 NOTE — OB PROVIDER DELIVERY SUMMARY - NSLACERATCATEGORY_OBGYN_ALL_OB
Periurethral Xerosis Aggressive Treatment: I recommended application of Cetaphil or CeraVe numerous times a day going to bed to all dry areas. I also prescribed a topical steroid for twice daily use.

## 2023-12-01 NOTE — OB RN PATIENT PROFILE - NS_BABIESUTERO_OBGYN_ALL_OB_NU
LVM for patient to call and schedule sooner than 12/20 and to get labs completed          
LVM for patient to call office for sooner appointment and to get labs completed before refills can be sent.  Waiting for call back.   
1

## 2024-06-17 NOTE — OB RN TRIAGE NOTE - NS_NPO_OBGYN_ALL_OB_DT
24-Jul-2023 13:00 Pt. received alert and oriented x3 with chief complaint of hitting left leg against machine while at work w/ wound to affected area and worsening since. Pt. also states swelling has worsened to left lower extremity. Pt. presents w/ bilateral pitting lower extremity edema w/ wound to left anterior lower extremity. Pt. denies fever/body aches or chills.
